# Patient Record
Sex: FEMALE | Race: WHITE | NOT HISPANIC OR LATINO | Employment: FULL TIME | URBAN - NONMETROPOLITAN AREA
[De-identification: names, ages, dates, MRNs, and addresses within clinical notes are randomized per-mention and may not be internally consistent; named-entity substitution may affect disease eponyms.]

---

## 2017-07-20 ENCOUNTER — HOSPITAL ENCOUNTER (EMERGENCY)
Facility: HOSPITAL | Age: 27
Discharge: HOME | End: 2017-07-20
Attending: EMERGENCY MEDICINE | Admitting: EMERGENCY MEDICINE

## 2017-07-20 VITALS
OXYGEN SATURATION: 100 % | SYSTOLIC BLOOD PRESSURE: 118 MMHG | TEMPERATURE: 98.1 F | HEIGHT: 63 IN | RESPIRATION RATE: 18 BRPM | DIASTOLIC BLOOD PRESSURE: 65 MMHG | WEIGHT: 170 LBS | HEART RATE: 72 BPM | BODY MASS INDEX: 30.12 KG/M2

## 2017-07-20 DIAGNOSIS — J06.9 UPPER RESPIRATORY TRACT INFECTION, UNSPECIFIED TYPE: Primary | ICD-10-CM

## 2017-07-20 PROCEDURE — 99282 EMERGENCY DEPT VISIT SF MDM: CPT

## 2017-07-20 RX ORDER — FLUTICASONE PROPIONATE 50 MCG
2 SPRAY, SUSPENSION (ML) NASAL DAILY
Qty: 1 EACH | Refills: 0 | Status: SHIPPED | OUTPATIENT
Start: 2017-07-20 | End: 2017-08-19

## 2017-07-20 NOTE — DISCHARGE INSTRUCTIONS
Use Flonase and Claritin as needed.  Follow-up with primary care physician if symptoms do not improve.  Return to ER for worsening.

## 2017-07-20 NOTE — ED PROVIDER NOTES
Subjective   HPI Comments: 27 years old female with no significant medical history presented in the ER for evaluation of URI symptoms with congestion, on and off headache and sometimes difficulty breathing.  Symptoms are particularly associated with since she started staying with her mother last week where she thinks they have mold in her house.  Also her father changed carpets which triggered to her symptoms.  She has no fever or chills reported.  Her headache is on and off and pressure-like sensation in the face and forehead.  No fever or chills.  No vomiting.  No focal neuro symptoms.    Patient is a 27 y.o. female presenting with URI.   History provided by:  Patient  URI   Presenting symptoms: congestion, cough and sore throat    Presenting symptoms: no fever and no rhinorrhea    Congestion:     Location:  Nasal    Interferes with sleep: yes      Interferes with eating/drinking: no    Cough:     Cough characteristics:  Dry    Severity:  Mild    Onset quality:  Gradual    Duration:  4 days    Timing:  Intermittent    Progression:  Waxing and waning    Chronicity:  New  Sore throat:     Severity:  Mild    Onset quality:  Gradual    Duration:  4 days    Timing:  Constant    Progression:  Improving  Severity:  Mild  Onset quality:  Gradual  Duration:  5 days  Timing:  Constant  Progression:  Improving  Chronicity:  New  Relieved by:  Nothing  Ineffective treatments:  None tried  Associated symptoms: headaches and myalgias    Associated symptoms: no neck pain and no wheezing    Headaches:     Severity:  Moderate    Onset quality:  Gradual    Duration:  1 week    Timing:  Intermittent    Progression:  Waxing and waning  Myalgias:     Location:  Generalized  Risk factors: sick contacts    Risk factors: not elderly, no diabetes mellitus, no recent illness and no recent travel        Review of Systems   Constitutional: Negative for chills and fever.   HENT: Positive for congestion, postnasal drip and sore throat.  Negative for facial swelling, rhinorrhea and sinus pressure.    Eyes: Negative for photophobia and visual disturbance.   Respiratory: Positive for cough. Negative for chest tightness, shortness of breath and wheezing.    Cardiovascular: Negative for chest pain.   Gastrointestinal: Negative for abdominal pain, diarrhea, nausea and vomiting.   Endocrine: Negative for polyuria.   Genitourinary: Negative for flank pain.   Musculoskeletal: Positive for myalgias. Negative for back pain, joint swelling and neck pain.   Skin: Negative for rash.   Neurological: Positive for headaches. Negative for seizures and numbness.   Hematological: Negative for adenopathy.   Psychiatric/Behavioral: Negative for agitation.       Past Medical History:   Diagnosis Date   • Shingles        Allergies   Allergen Reactions   • Motrin [Ibuprofen]        Past Surgical History:   Procedure Laterality Date   • TONSILLECTOMY         History reviewed. No pertinent family history.    Social History     Social History   • Marital status:      Spouse name: N/A   • Number of children: N/A   • Years of education: N/A     Social History Main Topics   • Smoking status: Never Smoker   • Smokeless tobacco: None   • Alcohol use No   • Drug use: No   • Sexual activity: Not Asked     Other Topics Concern   • None     Social History Narrative   • None           Objective   Physical Exam   Constitutional: She is oriented to person, place, and time. She appears well-developed and well-nourished. No distress.   HENT:   Head: Normocephalic and atraumatic.   Nose: Mucosal edema present. Right sinus exhibits no maxillary sinus tenderness. Left sinus exhibits no maxillary sinus tenderness.   Mouth/Throat: Posterior oropharyngeal erythema present.   Post nasal drip   Eyes: Conjunctivae and EOM are normal. Pupils are equal, round, and reactive to light.   Neck: Normal range of motion. Neck supple.   Cardiovascular: Normal rate, regular rhythm and normal heart  sounds.    Pulmonary/Chest: Effort normal and breath sounds normal. No respiratory distress. She has no wheezes.   Musculoskeletal: Normal range of motion. She exhibits no edema or deformity.   Neurological: She is alert and oriented to person, place, and time.   Skin: Skin is warm and dry.   Psychiatric: She has a normal mood and affect.   Nursing note and vitals reviewed.      Procedures         ED Course  ED Course   Comment By Time   I believe she has URI symptoms which triggered from exposure to allergens and have recommended using Flonase and Claritin as needed.  Do not believe she needs any other workup and can be discharged with outpatient follow-up. Rory Cook MD 07/20 2168          Labs Reviewed - No data to display    No results found.              [facility]    Final diagnoses:   Upper respiratory tract infection, unspecified type            Rory Cook MD  07/21/17 0141

## 2017-07-21 ENCOUNTER — TELEPHONE (OUTPATIENT)
Dept: PULMONOLOGY | Facility: CLINIC | Age: 27
End: 2017-07-21

## 2017-07-21 NOTE — TELEPHONE ENCOUNTER
----- Message from Julita Reilly sent at 7/20/2017  4:56 PM CDT -----  Regarding: Er visit on 7/20/17  Contact: [phone]  Diagnoses this visit     Your diagnosis was Upper respiratory infection.     Follow-up Information     Follow up with Family practice residency. Call in 3 days.    Why: As needed, for re evaluation    Contact information:    [address]    [place] [address] [phone]        Follow up with [facility].    Specialty: Emergency Medicine    Why: As needed, If symptoms worsen    Contact information:    [address] [facility] [address] [phone]     Discharge Instructions     Use Flonase and Claritin as needed.  Follow-up with primary care physician if symptoms do not improve.  Return to ER for worsening.     Discharge References/Attachments     UPPER RESPIRATORY INFECTION, ADULT, EASY-TO-READ (ENGLISH)      I spoke with the patient.  She is doing much better.  She didn't want an appointment. They are just passing through on vacation. On  leave.